# Patient Record
Sex: FEMALE | Race: WHITE | NOT HISPANIC OR LATINO | URBAN - METROPOLITAN AREA
[De-identification: names, ages, dates, MRNs, and addresses within clinical notes are randomized per-mention and may not be internally consistent; named-entity substitution may affect disease eponyms.]

---

## 2017-05-02 ENCOUNTER — OFFICE (OUTPATIENT)
Dept: URBAN - METROPOLITAN AREA CLINIC 10 | Facility: CLINIC | Age: 72
End: 2017-05-02
Payer: COMMERCIAL

## 2017-05-02 VITALS
DIASTOLIC BLOOD PRESSURE: 80 MMHG | WEIGHT: 149 LBS | HEART RATE: 73 BPM | HEIGHT: 64 IN | SYSTOLIC BLOOD PRESSURE: 176 MMHG

## 2017-05-02 DIAGNOSIS — E11.9 TYPE 2 DIABETES MELLITUS WITHOUT COMPLICATIONS: ICD-10-CM

## 2017-05-02 DIAGNOSIS — R11.0 NAUSEA: ICD-10-CM

## 2017-05-02 DIAGNOSIS — I10 ESSENTIAL (PRIMARY) HYPERTENSION: ICD-10-CM

## 2017-05-02 DIAGNOSIS — R19.7 DIARRHEA, UNSPECIFIED: ICD-10-CM

## 2017-05-02 DIAGNOSIS — R10.30 LOWER ABDOMINAL PAIN, UNSPECIFIED: ICD-10-CM

## 2017-05-02 DIAGNOSIS — E66.3 OVERWEIGHT: ICD-10-CM

## 2017-05-02 LAB
CBC COMPLETE BLOOD COUNT W/O DIFF: HEMATOCRIT: 37.3 % (ref 36–48)
CBC COMPLETE BLOOD COUNT W/O DIFF: HEMOGLOBIN: 12.6 G/DL (ref 12–16)
CBC COMPLETE BLOOD COUNT W/O DIFF: MCH: 29.2 PG (ref 25–35)
CBC COMPLETE BLOOD COUNT W/O DIFF: MCHC: 33.8 % (ref 30–38)
CBC COMPLETE BLOOD COUNT W/O DIFF: MCV: 86.3 FL (ref 78–102)
CBC COMPLETE BLOOD COUNT W/O DIFF: PLATELET COUNT: 194 K/UL (ref 150–450)
CBC COMPLETE BLOOD COUNT W/O DIFF: RBC DISTRIBUTION WIDTH: 13.8 % (ref 11.5–16)
CBC COMPLETE BLOOD COUNT W/O DIFF: RED BLOOD CELL COUNT: 4.32 M/UL (ref 4–5.5)
CBC COMPLETE BLOOD COUNT W/O DIFF: WHITE BLOOD CELL COUNT: 5.1 K/UL (ref 4–11)
COMPREHENSIVE METABOLIC PANEL: ALBUMIN: 4.4 G/DL (ref 3.5–5.2)
COMPREHENSIVE METABOLIC PANEL: ALKALINE PHOSPHATASE: 150 U/L — HIGH (ref 38–146)
COMPREHENSIVE METABOLIC PANEL: CALCIUM TOTAL: 10.4 MG/DL (ref 8.5–10.5)
COMPREHENSIVE METABOLIC PANEL: CARBON DIOXIDE: 27 MEQ/L (ref 21–31)
COMPREHENSIVE METABOLIC PANEL: CHLORIDE: 95 MEQ/L — LOW (ref 96–106)
COMPREHENSIVE METABOLIC PANEL: CREATININE: 0.73 MG/DL (ref 0.6–1.3)
COMPREHENSIVE METABOLIC PANEL: FASTING/NON-FASTING: (no result)
COMPREHENSIVE METABOLIC PANEL: GLUCOSE: 212 MG/DL — HIGH (ref 65–100)
COMPREHENSIVE METABOLIC PANEL: POTASSIUM: 4.5 MEQ/ML (ref 3.5–5.4)
COMPREHENSIVE METABOLIC PANEL: SGOT (AST): 32 U/L (ref 13–40)
COMPREHENSIVE METABOLIC PANEL: SGPT (ALT): 32 U/L (ref 7–52)
COMPREHENSIVE METABOLIC PANEL: SODIUM: 139 MEQ/L (ref 135–145)
COMPREHENSIVE METABOLIC PANEL: TOTAL BILIRUBIN: 0.4 MG/DL (ref 0.3–1.2)
COMPREHENSIVE METABOLIC PANEL: TOTAL PROTEIN: 6.9 G/DL (ref 6.4–8.3)
COMPREHENSIVE METABOLIC PANEL: UREA NITROGEN: 20 MG/DL (ref 8–23)
THYROID STIMULATING HORMONE: TSH: 0.53 MIU/L (ref 0.3–4.2)

## 2017-05-02 PROCEDURE — G8427 DOCREV CUR MEDS BY ELIG CLIN: HCPCS

## 2017-05-02 PROCEDURE — 99214 OFFICE O/P EST MOD 30 MIN: CPT

## 2017-05-06 LAB — MOLECULAR STOOL PATHOGEN PANEL: STPANL: (no result)

## 2017-11-29 ENCOUNTER — OFFICE (OUTPATIENT)
Dept: URBAN - METROPOLITAN AREA CLINIC 10 | Facility: CLINIC | Age: 72
End: 2017-11-29
Payer: COMMERCIAL

## 2017-11-29 VITALS
HEIGHT: 64 IN | DIASTOLIC BLOOD PRESSURE: 65 MMHG | HEART RATE: 80 BPM | SYSTOLIC BLOOD PRESSURE: 151 MMHG | WEIGHT: 149 LBS

## 2017-11-29 DIAGNOSIS — R74.8 ABNORMAL LEVELS OF OTHER SERUM ENZYMES: ICD-10-CM

## 2017-11-29 DIAGNOSIS — R93.2 ABNORMAL FINDINGS ON DIAGNOSTIC IMAGING OF LIVER AND BILIARY: ICD-10-CM

## 2017-11-29 LAB
CREATININE, SERUM: 0.67 MG/DL (ref 0.57–1)
CREATININE, SERUM: EGFR IF AFRICN AM: 102 ML/MIN/1.73 (ref 59–?)
CREATININE, SERUM: EGFR IF NONAFRICN AM: 89 ML/MIN/1.73 (ref 59–?)
HEPATIC FUNCTION PANEL (7): ALBUMIN, SERUM: 4.4 G/DL (ref 3.5–4.8)
HEPATIC FUNCTION PANEL (7): ALKALINE PHOSPHATASE, S: 242 IU/L — HIGH (ref 39–117)
HEPATIC FUNCTION PANEL (7): ALT (SGPT): 59 IU/L — HIGH (ref 0–32)
HEPATIC FUNCTION PANEL (7): AST (SGOT): 69 IU/L — HIGH (ref 0–40)
HEPATIC FUNCTION PANEL (7): BILIRUBIN, DIRECT: 0.25 MG/DL (ref 0–0.4)
HEPATIC FUNCTION PANEL (7): BILIRUBIN, TOTAL: 0.5 MG/DL (ref 0–1.2)
HEPATIC FUNCTION PANEL (7): PROTEIN, TOTAL, SERUM: 7.3 G/DL (ref 6–8.5)

## 2017-11-29 PROCEDURE — G8427 DOCREV CUR MEDS BY ELIG CLIN: HCPCS

## 2017-11-29 PROCEDURE — 99214 OFFICE O/P EST MOD 30 MIN: CPT

## 2017-12-04 ENCOUNTER — OFFICE (OUTPATIENT)
Dept: URBAN - METROPOLITAN AREA CLINIC 23 | Facility: CLINIC | Age: 72
End: 2017-12-04
Payer: COMMERCIAL

## 2017-12-04 DIAGNOSIS — R93.2 ABNORMAL FINDINGS ON DIAGNOSTIC IMAGING OF LIVER AND BILIARY: ICD-10-CM

## 2017-12-04 PROCEDURE — 74177 CT ABD & PELVIS W/CONTRAST: CPT | Mod: TC

## 2017-12-13 ENCOUNTER — OFFICE (OUTPATIENT)
Dept: URBAN - METROPOLITAN AREA CLINIC 10 | Facility: CLINIC | Age: 72
End: 2017-12-13

## 2018-01-29 ENCOUNTER — OFFICE (OUTPATIENT)
Dept: URBAN - METROPOLITAN AREA CLINIC 10 | Facility: CLINIC | Age: 73
End: 2018-01-29

## 2018-02-07 ENCOUNTER — OFFICE (OUTPATIENT)
Dept: URBAN - METROPOLITAN AREA CLINIC 10 | Facility: CLINIC | Age: 73
End: 2018-02-07
Payer: COMMERCIAL

## 2018-02-07 VITALS
WEIGHT: 141 LBS | HEART RATE: 87 BPM | HEIGHT: 64 IN | DIASTOLIC BLOOD PRESSURE: 65 MMHG | SYSTOLIC BLOOD PRESSURE: 156 MMHG

## 2018-02-07 DIAGNOSIS — K74.60 UNSPECIFIED CIRRHOSIS OF LIVER: ICD-10-CM

## 2018-02-07 DIAGNOSIS — R94.5 ABNORMAL RESULTS OF LIVER FUNCTION STUDIES: ICD-10-CM

## 2018-02-07 DIAGNOSIS — E11.9 TYPE 2 DIABETES MELLITUS WITHOUT COMPLICATIONS: ICD-10-CM

## 2018-02-07 DIAGNOSIS — I10 ESSENTIAL (PRIMARY) HYPERTENSION: ICD-10-CM

## 2018-02-07 DIAGNOSIS — K75.81 NONALCOHOLIC STEATOHEPATITIS (NASH): ICD-10-CM

## 2018-02-07 LAB
ANTINUCLEAR ANTIBODIES, IFA: POSITIVE
FANA STAINING PATTERNS: CENTRIOLE PATTERN: (no result)
FANA STAINING PATTERNS: CENTROMERE PATTERN: (no result)
FANA STAINING PATTERNS: HOMOGENEOUS PATTERN: ABNORMAL
FANA STAINING PATTERNS: MIDBODY PATTERN: (no result)
FANA STAINING PATTERNS: NOTE: (no result)
FANA STAINING PATTERNS: NUCLEAR DOT PATTERN: (no result)
FANA STAINING PATTERNS: NUCLEAR MEMBRANE PATTERN: (no result)
FANA STAINING PATTERNS: NUCLEOLAR PATTERN: (no result)
FANA STAINING PATTERNS: PCNA PATTERN: (no result)
FANA STAINING PATTERNS: SPECKLED PATTERN: ABNORMAL
FANA STAINING PATTERNS: SPINDLE APPARATUS PATTERN: (no result)
HBSAG SCREEN: NEGATIVE
HCV ANTIBODY: HEP C VIRUS AB: <0.1 S/CO RATIO
HEP B SURFACE AB: HEP B SURFACE AB, QUAL: NON REACTIVE

## 2018-02-07 PROCEDURE — 99214 OFFICE O/P EST MOD 30 MIN: CPT

## 2018-04-03 ENCOUNTER — OFFICE (OUTPATIENT)
Dept: URBAN - METROPOLITAN AREA CLINIC 10 | Facility: CLINIC | Age: 73
End: 2018-04-03
Payer: COMMERCIAL

## 2018-04-03 VITALS
SYSTOLIC BLOOD PRESSURE: 157 MMHG | WEIGHT: 143 LBS | HEART RATE: 66 BPM | DIASTOLIC BLOOD PRESSURE: 80 MMHG | HEIGHT: 64 IN

## 2018-04-03 DIAGNOSIS — E11.9 TYPE 2 DIABETES MELLITUS WITHOUT COMPLICATIONS: ICD-10-CM

## 2018-04-03 DIAGNOSIS — I10 ESSENTIAL (PRIMARY) HYPERTENSION: ICD-10-CM

## 2018-04-03 DIAGNOSIS — R74.8 ABNORMAL LEVELS OF OTHER SERUM ENZYMES: ICD-10-CM

## 2018-04-03 PROCEDURE — 99213 OFFICE O/P EST LOW 20 MIN: CPT

## 2018-07-24 ENCOUNTER — OFFICE (OUTPATIENT)
Dept: URBAN - METROPOLITAN AREA CLINIC 10 | Facility: CLINIC | Age: 73
End: 2018-07-24
Payer: COMMERCIAL

## 2018-07-24 VITALS
SYSTOLIC BLOOD PRESSURE: 135 MMHG | DIASTOLIC BLOOD PRESSURE: 60 MMHG | HEIGHT: 64 IN | HEART RATE: 70 BPM | WEIGHT: 136 LBS

## 2018-07-24 DIAGNOSIS — R13.10 DYSPHAGIA, UNSPECIFIED: ICD-10-CM

## 2018-07-24 DIAGNOSIS — K74.60 UNSPECIFIED CIRRHOSIS OF LIVER: ICD-10-CM

## 2018-07-24 LAB
ACTIN (SMOOTH MUSCLE) ANTIBODY: 10 UNITS (ref 0–19)
ALPHA-1-ANTITRYPSIN, SERUM: 207 MG/DL — HIGH (ref 90–200)
CBC, PLATELET, NO DIFFERENTIAL: HEMATOCRIT: 37.8 % (ref 34–46.6)
CBC, PLATELET, NO DIFFERENTIAL: HEMOGLOBIN: 12.3 G/DL (ref 11.1–15.9)
CBC, PLATELET, NO DIFFERENTIAL: MCH: 29.4 PG (ref 26.6–33)
CBC, PLATELET, NO DIFFERENTIAL: MCHC: 32.5 G/DL (ref 31.5–35.7)
CBC, PLATELET, NO DIFFERENTIAL: MCV: 90 FL (ref 79–97)
CBC, PLATELET, NO DIFFERENTIAL: PLATELETS: 156 X10E3/UL (ref 150–379)
CBC, PLATELET, NO DIFFERENTIAL: RBC: 4.18 X10E6/UL (ref 3.77–5.28)
CBC, PLATELET, NO DIFFERENTIAL: RDW: 14.6 % (ref 12.3–15.4)
CBC, PLATELET, NO DIFFERENTIAL: WBC: 6.2 X10E3/UL (ref 3.4–10.8)
CERULOPLASMIN: 48.5 MG/DL — HIGH (ref 19–39)
COMP. METABOLIC PANEL (14): A/G RATIO: 1.5 (ref 1.2–2.2)
COMP. METABOLIC PANEL (14): ALBUMIN: 4.3 G/DL (ref 3.5–4.8)
COMP. METABOLIC PANEL (14): ALKALINE PHOSPHATASE: 339 IU/L — HIGH (ref 39–117)
COMP. METABOLIC PANEL (14): ALT (SGPT): 57 IU/L — HIGH (ref 0–32)
COMP. METABOLIC PANEL (14): AST (SGOT): 70 IU/L — HIGH (ref 0–40)
COMP. METABOLIC PANEL (14): BILIRUBIN, TOTAL: 0.4 MG/DL (ref 0–1.2)
COMP. METABOLIC PANEL (14): BUN/CREATININE RATIO: 24 (ref 12–28)
COMP. METABOLIC PANEL (14): BUN: 17 MG/DL (ref 8–27)
COMP. METABOLIC PANEL (14): CALCIUM: 10.3 MG/DL (ref 8.7–10.3)
COMP. METABOLIC PANEL (14): CARBON DIOXIDE, TOTAL: 27 MMOL/L (ref 20–29)
COMP. METABOLIC PANEL (14): CHLORIDE: 100 MMOL/L (ref 96–106)
COMP. METABOLIC PANEL (14): CREATININE: 0.7 MG/DL (ref 0.57–1)
COMP. METABOLIC PANEL (14): EGFR IF AFRICN AM: 100 ML/MIN/1.73 (ref 59–?)
COMP. METABOLIC PANEL (14): EGFR IF NONAFRICN AM: 87 ML/MIN/1.73 (ref 59–?)
COMP. METABOLIC PANEL (14): GLOBULIN, TOTAL: 2.9 G/DL (ref 1.5–4.5)
COMP. METABOLIC PANEL (14): GLUCOSE: 86 MG/DL (ref 65–99)
COMP. METABOLIC PANEL (14): POTASSIUM: 3.5 MMOL/L (ref 3.5–5.2)
COMP. METABOLIC PANEL (14): PROTEIN, TOTAL: 7.2 G/DL (ref 6–8.5)
COMP. METABOLIC PANEL (14): SODIUM: 145 MMOL/L — HIGH (ref 134–144)
FE+TIBC+FER: FERRITIN, SERUM: 115 NG/ML (ref 15–150)
FE+TIBC+FER: IRON BIND.CAP.(TIBC): 346 UG/DL (ref 250–450)
FE+TIBC+FER: IRON SATURATION: 17 % (ref 15–55)
FE+TIBC+FER: IRON: 60 UG/DL (ref 27–139)
FE+TIBC+FER: UIBC: 286 UG/DL (ref 118–369)
IMMUNOGLOBULIN G, QN, SERUM: 634 MG/DL — LOW (ref 700–1600)
MITOCHONDRIAL (M2) ANTIBODY: 4 UNITS (ref 0–20)
PROTHROMBIN TIME (PT): INR: 1.1 (ref 0.8–1.2)
PROTHROMBIN TIME (PT): PROTHROMBIN TIME: 11 SEC (ref 9.1–12)

## 2018-07-24 PROCEDURE — 99214 OFFICE O/P EST MOD 30 MIN: CPT | Performed by: INTERNAL MEDICINE

## 2018-07-24 NOTE — SERVICEHPINOTES
Ms. Levine is 73 years old. She is here for initial visit with me (former [facility] patient) with chief complaint of RUQ abdominal pain. She describes pain as constant and all the time. She denies mitigating or inciting factors. She describes pain as dull ache. She describes the pain as being for 3 years. She denies nocturnal symptoms. She also complains of constant nausea. She denies emesis. She describes 13 pound weight loss over the past 7 months. She states she "can't eat". The thought of food makes her have nausea. She describes dysphagia to solids. She also feels liquids will have difficulty but solids are more frequent.   Review of records from Dr. Montague:BR-4/2018-HCT 44.3/MCV 90/PLT 122BR-03/2018-homocystine level elevated at 17.5BR--AST 67/ALT 65/TB 0.7/alp 278/albumin 3.9BR--creatinine 0.71BR--TSH normalBR-07/2017-hep B core IgM negative, hep B surface antigen negativeBR--HCV antibody negativeBR--hep a IgM negativeBR--cortisol ykzvlu11/2018-hep B surface antibody negativeBR--ADRIANA positive at a titer > 1:217237/2017-CT scan of abdomen and pelvis with contrast: Moderate hepatomegaly with marked splenomegaly. Steatosis without mass. Several splenules. Gallbladder absent. Impression reads “cirrhosis and associated sequela”11/2017-liver ultrasound: Liver is 19.6 cm. Increased echogenicity without solid mass. Biliary tree normal. Normal flow in portal vein. Gallbladder absent.05/2017-fecal occult blood test negative x309/2015-EGD by Dr. Olvera for anemia on Eliquis: Hiatal hernia with Schatzki's ring. Antral gastropathy negative for H pylori.-CT scan of abdomen and pelvis with contrast: Hazy density in lilliam hepatis and extending inferiorly around the medial aspect of the inferior right lobe of liver with small amount of associated fluid. Pancreas normal. Status post cholecystectomy without biliary dilatation. Liver normal. Mild splenomegaly. Mildly enlarged gastrohepatic ligament lymph nodes unchanged since 2009.09/2014-EGD for epigastric pain: Small hiatal hernia. Negative for H pyloriBR-colonoscopy for diarrhea and family history of colon polyps: Left-sided diverticulosis. Internal hemorrhoids. Random biopsies demonstrating mild acute colitis consistent with acute self-limited colitis.

## 2018-09-04 ENCOUNTER — AMBULATORY SURGICAL CENTER (OUTPATIENT)
Dept: URBAN - METROPOLITAN AREA SURGERY 1 | Facility: SURGERY | Age: 73
End: 2018-09-04
Payer: COMMERCIAL

## 2018-09-04 ENCOUNTER — OFFICE (OUTPATIENT)
Dept: URBAN - METROPOLITAN AREA PATHOLOGY 22 | Facility: PATHOLOGY | Age: 73
End: 2018-09-04
Payer: COMMERCIAL

## 2018-09-04 VITALS
HEART RATE: 67 BPM | OXYGEN SATURATION: 99 % | HEART RATE: 74 BPM | OXYGEN SATURATION: 99 % | OXYGEN SATURATION: 94 % | DIASTOLIC BLOOD PRESSURE: 77 MMHG | HEART RATE: 67 BPM | SYSTOLIC BLOOD PRESSURE: 142 MMHG | SYSTOLIC BLOOD PRESSURE: 134 MMHG | HEIGHT: 64 IN | OXYGEN SATURATION: 96 % | SYSTOLIC BLOOD PRESSURE: 134 MMHG | SYSTOLIC BLOOD PRESSURE: 132 MMHG | SYSTOLIC BLOOD PRESSURE: 147 MMHG | HEART RATE: 75 BPM | HEART RATE: 80 BPM | SYSTOLIC BLOOD PRESSURE: 147 MMHG | WEIGHT: 136 LBS | RESPIRATION RATE: 20 BRPM | SYSTOLIC BLOOD PRESSURE: 116 MMHG | RESPIRATION RATE: 20 BRPM | DIASTOLIC BLOOD PRESSURE: 59 MMHG | HEIGHT: 64 IN | SYSTOLIC BLOOD PRESSURE: 116 MMHG | HEART RATE: 74 BPM | DIASTOLIC BLOOD PRESSURE: 64 MMHG | SYSTOLIC BLOOD PRESSURE: 142 MMHG | DIASTOLIC BLOOD PRESSURE: 60 MMHG | HEART RATE: 80 BPM | DIASTOLIC BLOOD PRESSURE: 59 MMHG | RESPIRATION RATE: 16 BRPM | OXYGEN SATURATION: 94 % | HEART RATE: 75 BPM | TEMPERATURE: 97.9 F | TEMPERATURE: 97.9 F | RESPIRATION RATE: 16 BRPM | OXYGEN SATURATION: 95 % | WEIGHT: 136 LBS | HEART RATE: 68 BPM | DIASTOLIC BLOOD PRESSURE: 77 MMHG | DIASTOLIC BLOOD PRESSURE: 60 MMHG | DIASTOLIC BLOOD PRESSURE: 74 MMHG | OXYGEN SATURATION: 95 % | DIASTOLIC BLOOD PRESSURE: 74 MMHG | SYSTOLIC BLOOD PRESSURE: 178 MMHG | HEART RATE: 68 BPM | DIASTOLIC BLOOD PRESSURE: 82 MMHG | DIASTOLIC BLOOD PRESSURE: 64 MMHG | SYSTOLIC BLOOD PRESSURE: 178 MMHG | DIASTOLIC BLOOD PRESSURE: 82 MMHG | OXYGEN SATURATION: 96 % | SYSTOLIC BLOOD PRESSURE: 132 MMHG

## 2018-09-04 DIAGNOSIS — K22.2 ESOPHAGEAL OBSTRUCTION: ICD-10-CM

## 2018-09-04 DIAGNOSIS — K29.50 UNSPECIFIED CHRONIC GASTRITIS WITHOUT BLEEDING: ICD-10-CM

## 2018-09-04 DIAGNOSIS — R10.13 EPIGASTRIC PAIN: ICD-10-CM

## 2018-09-04 DIAGNOSIS — K31.7 POLYP OF STOMACH AND DUODENUM: ICD-10-CM

## 2018-09-04 DIAGNOSIS — I85.00 ESOPHAGEAL VARICES WITHOUT BLEEDING: ICD-10-CM

## 2018-09-04 DIAGNOSIS — R13.10 DYSPHAGIA, UNSPECIFIED: ICD-10-CM

## 2018-09-04 PROBLEM — K31.89 OTHER DISEASES OF STOMACH AND DUODENUM: Status: ACTIVE | Noted: 2018-09-04

## 2018-09-04 PROCEDURE — 88313 SPECIAL STAINS GROUP 2: CPT | Performed by: INTERNAL MEDICINE

## 2018-09-04 PROCEDURE — 43249 ESOPH EGD DILATION <30 MM: CPT | Performed by: INTERNAL MEDICINE

## 2018-09-04 PROCEDURE — G8907 PT DOC NO EVENTS ON DISCHARG: HCPCS | Performed by: INTERNAL MEDICINE

## 2018-09-04 PROCEDURE — 43239 EGD BIOPSY SINGLE/MULTIPLE: CPT | Mod: 59 | Performed by: INTERNAL MEDICINE

## 2018-09-04 PROCEDURE — 88341 IMHCHEM/IMCYTCHM EA ADD ANTB: CPT | Performed by: INTERNAL MEDICINE

## 2018-09-04 PROCEDURE — G8918 PT W/O PREOP ORDER IV AB PRO: HCPCS | Performed by: INTERNAL MEDICINE

## 2018-09-04 PROCEDURE — 88342 IMHCHEM/IMCYTCHM 1ST ANTB: CPT | Performed by: INTERNAL MEDICINE

## 2018-09-04 PROCEDURE — 88305 TISSUE EXAM BY PATHOLOGIST: CPT | Performed by: INTERNAL MEDICINE

## 2019-01-08 ENCOUNTER — OFFICE (OUTPATIENT)
Dept: URBAN - METROPOLITAN AREA CLINIC 10 | Facility: CLINIC | Age: 74
End: 2019-01-08
Payer: COMMERCIAL

## 2019-01-08 VITALS
HEIGHT: 64 IN | WEIGHT: 132 LBS | HEART RATE: 85 BPM | SYSTOLIC BLOOD PRESSURE: 118 MMHG | DIASTOLIC BLOOD PRESSURE: 48 MMHG

## 2019-01-08 DIAGNOSIS — R13.10 DYSPHAGIA, UNSPECIFIED: ICD-10-CM

## 2019-01-08 DIAGNOSIS — D64.9 ANEMIA, UNSPECIFIED: ICD-10-CM

## 2019-01-08 LAB
CBC, PLATELET, NO DIFFERENTIAL: HEMATOCRIT: 26.9 % — LOW (ref 34–46.6)
CBC, PLATELET, NO DIFFERENTIAL: HEMOGLOBIN: 8.4 G/DL — LOW (ref 11.1–15.9)
CBC, PLATELET, NO DIFFERENTIAL: MCH: 28.5 PG (ref 26.6–33)
CBC, PLATELET, NO DIFFERENTIAL: MCHC: 31.2 G/DL — LOW (ref 31.5–35.7)
CBC, PLATELET, NO DIFFERENTIAL: MCV: 91 FL (ref 79–97)
CBC, PLATELET, NO DIFFERENTIAL: PLATELETS: 178 X10E3/UL (ref 150–379)
CBC, PLATELET, NO DIFFERENTIAL: RBC: 2.95 X10E6/UL — LOW (ref 3.77–5.28)
CBC, PLATELET, NO DIFFERENTIAL: RDW: 15.2 % (ref 12.3–15.4)
CBC, PLATELET, NO DIFFERENTIAL: WBC: 5.5 X10E3/UL (ref 3.4–10.8)
COMP. METABOLIC PANEL (14): A/G RATIO: 1.7 (ref 1.2–2.2)
COMP. METABOLIC PANEL (14): ALBUMIN: 4.4 G/DL (ref 3.5–4.8)
COMP. METABOLIC PANEL (14): ALKALINE PHOSPHATASE: 217 IU/L — HIGH (ref 39–117)
COMP. METABOLIC PANEL (14): ALT (SGPT): 30 IU/L (ref 0–32)
COMP. METABOLIC PANEL (14): AST (SGOT): 32 IU/L (ref 0–40)
COMP. METABOLIC PANEL (14): BILIRUBIN, TOTAL: 0.3 MG/DL (ref 0–1.2)
COMP. METABOLIC PANEL (14): BUN/CREATININE RATIO: 39 — HIGH (ref 12–28)
COMP. METABOLIC PANEL (14): BUN: 27 MG/DL (ref 8–27)
COMP. METABOLIC PANEL (14): CALCIUM: 10 MG/DL (ref 8.7–10.3)
COMP. METABOLIC PANEL (14): CARBON DIOXIDE, TOTAL: 24 MMOL/L (ref 20–29)
COMP. METABOLIC PANEL (14): CHLORIDE: 99 MMOL/L (ref 96–106)
COMP. METABOLIC PANEL (14): CREATININE: 0.69 MG/DL (ref 0.57–1)
COMP. METABOLIC PANEL (14): EGFR IF AFRICN AM: 100 ML/MIN/1.73 (ref 59–?)
COMP. METABOLIC PANEL (14): EGFR IF NONAFRICN AM: 87 ML/MIN/1.73 (ref 59–?)
COMP. METABOLIC PANEL (14): GLOBULIN, TOTAL: 2.6 G/DL (ref 1.5–4.5)
COMP. METABOLIC PANEL (14): GLUCOSE: 123 MG/DL — HIGH (ref 65–99)
COMP. METABOLIC PANEL (14): POTASSIUM: 4.6 MMOL/L (ref 3.5–5.2)
COMP. METABOLIC PANEL (14): PROTEIN, TOTAL: 7 G/DL (ref 6–8.5)
COMP. METABOLIC PANEL (14): SODIUM: 140 MMOL/L (ref 134–144)
FE+TIBC+FER: FERRITIN, SERUM: 18 NG/ML (ref 15–150)
FE+TIBC+FER: IRON BIND.CAP.(TIBC): 521 UG/DL — HIGH (ref 250–450)
FE+TIBC+FER: IRON SATURATION: 6 % — CRITICAL LOW (ref 15–55)
FE+TIBC+FER: IRON: 30 UG/DL (ref 27–139)
FE+TIBC+FER: UIBC: 491 UG/DL — HIGH (ref 118–369)

## 2019-01-08 PROCEDURE — 99213 OFFICE O/P EST LOW 20 MIN: CPT | Performed by: INTERNAL MEDICINE

## 2019-01-22 ENCOUNTER — OFFICE (OUTPATIENT)
Dept: URBAN - METROPOLITAN AREA CLINIC 10 | Facility: CLINIC | Age: 74
End: 2019-01-22

## 2019-02-06 ENCOUNTER — OFFICE (OUTPATIENT)
Dept: URBAN - METROPOLITAN AREA CLINIC 10 | Facility: CLINIC | Age: 74
End: 2019-02-06
Payer: COMMERCIAL

## 2019-02-06 VITALS
HEART RATE: 68 BPM | DIASTOLIC BLOOD PRESSURE: 60 MMHG | HEIGHT: 64 IN | WEIGHT: 137 LBS | SYSTOLIC BLOOD PRESSURE: 138 MMHG

## 2019-02-06 DIAGNOSIS — K74.60 UNSPECIFIED CIRRHOSIS OF LIVER: ICD-10-CM

## 2019-02-06 DIAGNOSIS — D50.9 IRON DEFICIENCY ANEMIA, UNSPECIFIED: ICD-10-CM

## 2019-02-06 DIAGNOSIS — K57.92 DIVERTICULITIS OF INTESTINE, PART UNSPECIFIED, WITHOUT PERFO: ICD-10-CM

## 2019-02-06 LAB
CBC, PLATELET, NO DIFFERENTIAL: HEMATOCRIT: 33.9 % — LOW (ref 34–46.6)
CBC, PLATELET, NO DIFFERENTIAL: HEMOGLOBIN: 9.8 G/DL — LOW (ref 11.1–15.9)
CBC, PLATELET, NO DIFFERENTIAL: MCH: 26.1 PG — LOW (ref 26.6–33)
CBC, PLATELET, NO DIFFERENTIAL: MCHC: 28.9 G/DL — LOW (ref 31.5–35.7)
CBC, PLATELET, NO DIFFERENTIAL: MCV: 90 FL (ref 79–97)
CBC, PLATELET, NO DIFFERENTIAL: NRBC: (no result)
CBC, PLATELET, NO DIFFERENTIAL: PLATELETS: 127 X10E3/UL — LOW (ref 150–379)
CBC, PLATELET, NO DIFFERENTIAL: RBC: 3.76 X10E6/UL — LOW (ref 3.77–5.28)
CBC, PLATELET, NO DIFFERENTIAL: RDW: 19.7 % — HIGH (ref 12.3–15.4)
CBC, PLATELET, NO DIFFERENTIAL: WBC: 5.7 X10E3/UL (ref 3.4–10.8)
COMP. METABOLIC PANEL (14): A/G RATIO: 2.3 — HIGH (ref 1.2–2.2)
COMP. METABOLIC PANEL (14): ALBUMIN: 4.3 G/DL (ref 3.5–4.8)
COMP. METABOLIC PANEL (14): ALKALINE PHOSPHATASE: 237 IU/L — HIGH (ref 39–117)
COMP. METABOLIC PANEL (14): ALT (SGPT): 22 IU/L (ref 0–32)
COMP. METABOLIC PANEL (14): AST (SGOT): 31 IU/L (ref 0–40)
COMP. METABOLIC PANEL (14): BILIRUBIN, TOTAL: 0.3 MG/DL (ref 0–1.2)
COMP. METABOLIC PANEL (14): BUN/CREATININE RATIO: 20 (ref 12–28)
COMP. METABOLIC PANEL (14): BUN: 13 MG/DL (ref 8–27)
COMP. METABOLIC PANEL (14): CALCIUM: 9.5 MG/DL (ref 8.7–10.3)
COMP. METABOLIC PANEL (14): CARBON DIOXIDE, TOTAL: 26 MMOL/L (ref 20–29)
COMP. METABOLIC PANEL (14): CHLORIDE: 101 MMOL/L (ref 96–106)
COMP. METABOLIC PANEL (14): CREATININE: 0.64 MG/DL (ref 0.57–1)
COMP. METABOLIC PANEL (14): EGFR IF AFRICN AM: 102 ML/MIN/1.73 (ref 59–?)
COMP. METABOLIC PANEL (14): EGFR IF NONAFRICN AM: 89 ML/MIN/1.73 (ref 59–?)
COMP. METABOLIC PANEL (14): GLOBULIN, TOTAL: 1.9 G/DL (ref 1.5–4.5)
COMP. METABOLIC PANEL (14): GLUCOSE: 112 MG/DL — HIGH (ref 65–99)
COMP. METABOLIC PANEL (14): POTASSIUM: 4 MMOL/L (ref 3.5–5.2)
COMP. METABOLIC PANEL (14): PROTEIN, TOTAL: 6.2 G/DL (ref 6–8.5)
COMP. METABOLIC PANEL (14): SODIUM: 143 MMOL/L (ref 134–144)
IMMUNOGLOBULIN G, QN, SERUM: 672 MG/DL — LOW (ref 700–1600)

## 2019-02-06 PROCEDURE — 99213 OFFICE O/P EST LOW 20 MIN: CPT | Performed by: INTERNAL MEDICINE

## 2019-03-08 ENCOUNTER — OFFICE (OUTPATIENT)
Dept: URBAN - METROPOLITAN AREA PATHOLOGY 22 | Facility: PATHOLOGY | Age: 74
End: 2019-03-08
Payer: COMMERCIAL

## 2019-03-08 ENCOUNTER — AMBULATORY SURGICAL CENTER (OUTPATIENT)
Dept: URBAN - METROPOLITAN AREA SURGERY 1 | Facility: SURGERY | Age: 74
End: 2019-03-08
Payer: COMMERCIAL

## 2019-03-08 VITALS
SYSTOLIC BLOOD PRESSURE: 164 MMHG | SYSTOLIC BLOOD PRESSURE: 100 MMHG | TEMPERATURE: 97.8 F | OXYGEN SATURATION: 96 % | WEIGHT: 133 LBS | SYSTOLIC BLOOD PRESSURE: 100 MMHG | DIASTOLIC BLOOD PRESSURE: 56 MMHG | SYSTOLIC BLOOD PRESSURE: 114 MMHG | HEART RATE: 81 BPM | RESPIRATION RATE: 16 BRPM | HEART RATE: 71 BPM | DIASTOLIC BLOOD PRESSURE: 46 MMHG | RESPIRATION RATE: 17 BRPM | DIASTOLIC BLOOD PRESSURE: 56 MMHG | RESPIRATION RATE: 16 BRPM | DIASTOLIC BLOOD PRESSURE: 46 MMHG | DIASTOLIC BLOOD PRESSURE: 52 MMHG | SYSTOLIC BLOOD PRESSURE: 97 MMHG | HEART RATE: 80 BPM | DIASTOLIC BLOOD PRESSURE: 52 MMHG | HEIGHT: 64 IN | OXYGEN SATURATION: 94 % | OXYGEN SATURATION: 95 % | HEART RATE: 72 BPM | TEMPERATURE: 97.7 F | SYSTOLIC BLOOD PRESSURE: 128 MMHG | WEIGHT: 133 LBS | HEART RATE: 72 BPM | OXYGEN SATURATION: 94 % | HEART RATE: 80 BPM | RESPIRATION RATE: 17 BRPM | TEMPERATURE: 97.7 F | HEART RATE: 74 BPM | SYSTOLIC BLOOD PRESSURE: 114 MMHG | HEIGHT: 64 IN | SYSTOLIC BLOOD PRESSURE: 164 MMHG | HEART RATE: 85 BPM | SYSTOLIC BLOOD PRESSURE: 99 MMHG | HEART RATE: 71 BPM | OXYGEN SATURATION: 95 % | HEART RATE: 74 BPM | DIASTOLIC BLOOD PRESSURE: 63 MMHG | SYSTOLIC BLOOD PRESSURE: 97 MMHG | DIASTOLIC BLOOD PRESSURE: 83 MMHG | SYSTOLIC BLOOD PRESSURE: 99 MMHG | DIASTOLIC BLOOD PRESSURE: 83 MMHG | HEART RATE: 85 BPM | SYSTOLIC BLOOD PRESSURE: 128 MMHG | OXYGEN SATURATION: 96 % | HEART RATE: 81 BPM | DIASTOLIC BLOOD PRESSURE: 63 MMHG | TEMPERATURE: 97.8 F

## 2019-03-08 DIAGNOSIS — K64.0 FIRST DEGREE HEMORRHOIDS: ICD-10-CM

## 2019-03-08 DIAGNOSIS — D50.9 IRON DEFICIENCY ANEMIA, UNSPECIFIED: ICD-10-CM

## 2019-03-08 DIAGNOSIS — Z53.8 PROCEDURE AND TREATMENT NOT CARRIED OUT FOR OTHER REASONS: ICD-10-CM

## 2019-03-08 DIAGNOSIS — I85.00 ESOPHAGEAL VARICES WITHOUT BLEEDING: ICD-10-CM

## 2019-03-08 DIAGNOSIS — R13.10 DYSPHAGIA, UNSPECIFIED: ICD-10-CM

## 2019-03-08 PROBLEM — D53.9 UNEXPLAINED IRON DEFICIENCY ANEMIA: Status: ACTIVE | Noted: 2019-03-08

## 2019-03-08 LAB
ANTIPARIETAL CELL ANTIBODY: 1.8 UNITS (ref 0–20)
ANTIPARIETAL CELL ANTIBODY: 1.8 UNITS (ref 0–20)
CBC, PLATELET, NO DIFFERENTIAL: HEMATOCRIT: 33.6 % — LOW (ref 34–46.6)
CBC, PLATELET, NO DIFFERENTIAL: HEMATOCRIT: 33.6 % — LOW (ref 34–46.6)
CBC, PLATELET, NO DIFFERENTIAL: HEMOGLOBIN: 10.6 G/DL — LOW (ref 11.1–15.9)
CBC, PLATELET, NO DIFFERENTIAL: HEMOGLOBIN: 10.6 G/DL — LOW (ref 11.1–15.9)
CBC, PLATELET, NO DIFFERENTIAL: MCH: 28.3 PG (ref 26.6–33)
CBC, PLATELET, NO DIFFERENTIAL: MCH: 28.3 PG (ref 26.6–33)
CBC, PLATELET, NO DIFFERENTIAL: MCHC: 31.5 G/DL (ref 31.5–35.7)
CBC, PLATELET, NO DIFFERENTIAL: MCHC: 31.5 G/DL (ref 31.5–35.7)
CBC, PLATELET, NO DIFFERENTIAL: MCV: 90 FL (ref 79–97)
CBC, PLATELET, NO DIFFERENTIAL: MCV: 90 FL (ref 79–97)
CBC, PLATELET, NO DIFFERENTIAL: NRBC: 4 % — HIGH (ref 0–0)
CBC, PLATELET, NO DIFFERENTIAL: NRBC: 4 % — HIGH (ref 0–0)
CBC, PLATELET, NO DIFFERENTIAL: PLATELETS: 97 X10E3/UL — CRITICAL LOW (ref 150–379)
CBC, PLATELET, NO DIFFERENTIAL: PLATELETS: 97 X10E3/UL — CRITICAL LOW (ref 150–379)
CBC, PLATELET, NO DIFFERENTIAL: RBC: 3.74 X10E6/UL — LOW (ref 3.77–5.28)
CBC, PLATELET, NO DIFFERENTIAL: RBC: 3.74 X10E6/UL — LOW (ref 3.77–5.28)
CBC, PLATELET, NO DIFFERENTIAL: RDW: 19 % — HIGH (ref 12.3–15.4)
CBC, PLATELET, NO DIFFERENTIAL: RDW: 19 % — HIGH (ref 12.3–15.4)
CBC, PLATELET, NO DIFFERENTIAL: WBC: 3.8 X10E3/UL (ref 3.4–10.8)
CBC, PLATELET, NO DIFFERENTIAL: WBC: 3.8 X10E3/UL (ref 3.4–10.8)
INTRINSIC FACTOR ABS, SERUM: 1.1 AU/ML (ref 0–1.1)
INTRINSIC FACTOR ABS, SERUM: 1.1 AU/ML (ref 0–1.1)
PEPSINOGEN I (PG I) S/P: PEPSINOGEN I (PG I) P/S: 39 UG/ML (ref 28–100)
PEPSINOGEN I (PG I) S/P: PEPSINOGEN I (PG I) P/S: 39 UG/ML (ref 28–100)

## 2019-03-08 PROCEDURE — G8918 PT W/O PREOP ORDER IV AB PRO: HCPCS | Performed by: INTERNAL MEDICINE

## 2019-03-08 PROCEDURE — 45378 DIAGNOSTIC COLONOSCOPY: CPT | Mod: 74 | Performed by: INTERNAL MEDICINE

## 2019-03-08 PROCEDURE — 88305 TISSUE EXAM BY PATHOLOGIST: CPT | Performed by: INTERNAL MEDICINE

## 2019-03-08 PROCEDURE — 45378 DIAGNOSTIC COLONOSCOPY: CPT | Mod: 53 | Performed by: INTERNAL MEDICINE

## 2019-03-08 PROCEDURE — 43239 EGD BIOPSY SINGLE/MULTIPLE: CPT | Mod: 51 | Performed by: INTERNAL MEDICINE

## 2019-03-08 PROCEDURE — G8907 PT DOC NO EVENTS ON DISCHARG: HCPCS | Performed by: INTERNAL MEDICINE

## 2019-03-08 NOTE — SERVICEHPINOTES
74-year-old with a history of cricopharyngeal ring and Schatzki's ring worsening dysphagia and iron deficiency anemia that has required blood transfusion.  She denies overt bleeding for abdominal pain today.

## 2019-04-19 ENCOUNTER — INPATIENT HOSPITAL (OUTPATIENT)
Dept: URBAN - METROPOLITAN AREA HOSPITAL 93 | Facility: HOSPITAL | Age: 74
End: 2019-04-19
Payer: COMMERCIAL

## 2019-04-19 DIAGNOSIS — D50.0 IRON DEFICIENCY ANEMIA SECONDARY TO BLOOD LOSS (CHRONIC): ICD-10-CM

## 2019-04-19 DIAGNOSIS — K74.0 HEPATIC FIBROSIS: ICD-10-CM

## 2019-04-19 PROCEDURE — 99223 1ST HOSP IP/OBS HIGH 75: CPT | Performed by: INTERNAL MEDICINE

## 2019-04-20 ENCOUNTER — INPATIENT HOSPITAL (OUTPATIENT)
Dept: URBAN - METROPOLITAN AREA HOSPITAL 93 | Facility: HOSPITAL | Age: 74
End: 2019-04-20
Payer: COMMERCIAL

## 2019-04-20 DIAGNOSIS — D50.0 IRON DEFICIENCY ANEMIA SECONDARY TO BLOOD LOSS (CHRONIC): ICD-10-CM

## 2019-04-20 DIAGNOSIS — K74.0 HEPATIC FIBROSIS: ICD-10-CM

## 2019-04-20 PROCEDURE — 99231 SBSQ HOSP IP/OBS SF/LOW 25: CPT | Performed by: INTERNAL MEDICINE

## 2019-04-21 PROCEDURE — 99231 SBSQ HOSP IP/OBS SF/LOW 25: CPT | Performed by: INTERNAL MEDICINE

## 2019-05-20 ENCOUNTER — INPATIENT HOSPITAL (OUTPATIENT)
Dept: URBAN - METROPOLITAN AREA HOSPITAL 93 | Facility: HOSPITAL | Age: 74
End: 2019-05-20
Payer: COMMERCIAL

## 2019-05-20 DIAGNOSIS — D50.0 IRON DEFICIENCY ANEMIA SECONDARY TO BLOOD LOSS (CHRONIC): ICD-10-CM

## 2019-05-20 DIAGNOSIS — R93.3 ABNORMAL FINDINGS ON DIAGNOSTIC IMAGING OF OTHER PARTS OF DI: ICD-10-CM

## 2019-05-20 DIAGNOSIS — K74.0 HEPATIC FIBROSIS: ICD-10-CM

## 2019-05-20 PROCEDURE — 99223 1ST HOSP IP/OBS HIGH 75: CPT | Performed by: INTERNAL MEDICINE

## 2019-05-21 PROCEDURE — 99231 SBSQ HOSP IP/OBS SF/LOW 25: CPT | Performed by: INTERNAL MEDICINE

## 2019-05-22 PROCEDURE — 99232 SBSQ HOSP IP/OBS MODERATE 35: CPT | Performed by: INTERNAL MEDICINE

## 2019-05-23 ENCOUNTER — INPATIENT HOSPITAL (OUTPATIENT)
Dept: URBAN - METROPOLITAN AREA HOSPITAL 93 | Facility: HOSPITAL | Age: 74
End: 2019-05-23
Payer: COMMERCIAL

## 2019-05-23 DIAGNOSIS — R93.3 ABNORMAL FINDINGS ON DIAGNOSTIC IMAGING OF OTHER PARTS OF DI: ICD-10-CM

## 2019-05-23 DIAGNOSIS — D50.0 IRON DEFICIENCY ANEMIA SECONDARY TO BLOOD LOSS (CHRONIC): ICD-10-CM

## 2019-05-23 DIAGNOSIS — K74.0 HEPATIC FIBROSIS: ICD-10-CM

## 2019-05-23 PROCEDURE — 99232 SBSQ HOSP IP/OBS MODERATE 35: CPT | Performed by: INTERNAL MEDICINE

## 2019-05-24 ENCOUNTER — INPATIENT HOSPITAL (OUTPATIENT)
Dept: URBAN - METROPOLITAN AREA HOSPITAL 93 | Facility: HOSPITAL | Age: 74
End: 2019-05-24
Payer: COMMERCIAL

## 2019-05-24 DIAGNOSIS — R93.3 ABNORMAL FINDINGS ON DIAGNOSTIC IMAGING OF OTHER PARTS OF DI: ICD-10-CM

## 2019-05-24 DIAGNOSIS — K74.0 HEPATIC FIBROSIS: ICD-10-CM

## 2019-05-24 DIAGNOSIS — D50.0 IRON DEFICIENCY ANEMIA SECONDARY TO BLOOD LOSS (CHRONIC): ICD-10-CM

## 2019-05-24 PROCEDURE — 99232 SBSQ HOSP IP/OBS MODERATE 35: CPT | Performed by: INTERNAL MEDICINE

## 2019-06-28 ENCOUNTER — OFFICE (OUTPATIENT)
Dept: URBAN - METROPOLITAN AREA CLINIC 10 | Facility: CLINIC | Age: 74
End: 2019-06-28
Payer: COMMERCIAL

## 2019-06-28 VITALS
HEIGHT: 64 IN | SYSTOLIC BLOOD PRESSURE: 120 MMHG | WEIGHT: 116 LBS | DIASTOLIC BLOOD PRESSURE: 56 MMHG | HEART RATE: 92 BPM

## 2019-06-28 DIAGNOSIS — K57.92 DIVERTICULITIS OF INTESTINE, PART UNSPECIFIED, WITHOUT PERFO: ICD-10-CM

## 2019-06-28 DIAGNOSIS — K92.1 MELENA: ICD-10-CM

## 2019-06-28 DIAGNOSIS — D50.9 IRON DEFICIENCY ANEMIA, UNSPECIFIED: ICD-10-CM

## 2019-06-28 DIAGNOSIS — R11.0 NAUSEA: ICD-10-CM

## 2019-06-28 DIAGNOSIS — R93.3 ABNORMAL FINDINGS ON DIAGNOSTIC IMAGING OF OTHER PARTS OF DI: ICD-10-CM

## 2019-06-28 DIAGNOSIS — K74.60 UNSPECIFIED CIRRHOSIS OF LIVER: ICD-10-CM

## 2019-06-28 LAB
CBC, PLATELET, NO DIFFERENTIAL: HEMATOCRIT: 27.3 % — LOW (ref 34–46.6)
CBC, PLATELET, NO DIFFERENTIAL: HEMOGLOBIN: 9.1 G/DL — LOW (ref 11.1–15.9)
CBC, PLATELET, NO DIFFERENTIAL: MCH: 31.2 PG (ref 26.6–33)
CBC, PLATELET, NO DIFFERENTIAL: MCHC: 33.3 G/DL (ref 31.5–35.7)
CBC, PLATELET, NO DIFFERENTIAL: MCV: 94 FL (ref 79–97)
CBC, PLATELET, NO DIFFERENTIAL: NRBC: (no result)
CBC, PLATELET, NO DIFFERENTIAL: PLATELETS: 180 X10E3/UL (ref 150–450)
CBC, PLATELET, NO DIFFERENTIAL: RBC: 2.92 X10E6/UL — LOW (ref 3.77–5.28)
CBC, PLATELET, NO DIFFERENTIAL: RDW: 16.5 % — HIGH (ref 12.3–15.4)
CBC, PLATELET, NO DIFFERENTIAL: WBC: 7.2 X10E3/UL (ref 3.4–10.8)

## 2019-06-28 PROCEDURE — 99214 OFFICE O/P EST MOD 30 MIN: CPT | Performed by: INTERNAL MEDICINE

## 2019-06-28 RX ORDER — SODIUM PICOSULFATE, MAGNESIUM OXIDE, AND ANHYDROUS CITRIC ACID 10; 3.5; 12 MG/160ML; G/160ML; G/160ML
LIQUID ORAL
Qty: 1 | Refills: 0 | Status: COMPLETED
Start: 2019-06-28 | End: 2019-08-23

## 2019-06-28 RX ORDER — METOCLOPRAMIDE 5 MG/1
TABLET ORAL
Qty: 120 | Refills: 5 | Status: COMPLETED
Start: 2019-06-28 | End: 2020-01-01

## 2019-08-23 ENCOUNTER — AMBULATORY SURGICAL CENTER (OUTPATIENT)
Dept: URBAN - METROPOLITAN AREA SURGERY 1 | Facility: SURGERY | Age: 74
End: 2019-08-23
Payer: COMMERCIAL

## 2019-08-23 ENCOUNTER — OFFICE (OUTPATIENT)
Dept: URBAN - METROPOLITAN AREA PATHOLOGY 22 | Facility: PATHOLOGY | Age: 74
End: 2019-08-23
Payer: COMMERCIAL

## 2019-08-23 VITALS
RESPIRATION RATE: 16 BRPM | RESPIRATION RATE: 14 BRPM | TEMPERATURE: 98.2 F | WEIGHT: 125 LBS | DIASTOLIC BLOOD PRESSURE: 57 MMHG | RESPIRATION RATE: 17 BRPM | HEIGHT: 64 IN | SYSTOLIC BLOOD PRESSURE: 132 MMHG | DIASTOLIC BLOOD PRESSURE: 51 MMHG | WEIGHT: 125 LBS | SYSTOLIC BLOOD PRESSURE: 102 MMHG | SYSTOLIC BLOOD PRESSURE: 132 MMHG | SYSTOLIC BLOOD PRESSURE: 145 MMHG | DIASTOLIC BLOOD PRESSURE: 51 MMHG | TEMPERATURE: 98.2 F | DIASTOLIC BLOOD PRESSURE: 50 MMHG | HEIGHT: 64 IN | SYSTOLIC BLOOD PRESSURE: 105 MMHG | TEMPERATURE: 98.3 F | DIASTOLIC BLOOD PRESSURE: 48 MMHG | SYSTOLIC BLOOD PRESSURE: 105 MMHG | RESPIRATION RATE: 14 BRPM | HEART RATE: 72 BPM | DIASTOLIC BLOOD PRESSURE: 50 MMHG | OXYGEN SATURATION: 100 % | RESPIRATION RATE: 16 BRPM | HEART RATE: 72 BPM | RESPIRATION RATE: 17 BRPM | DIASTOLIC BLOOD PRESSURE: 57 MMHG | HEART RATE: 76 BPM | OXYGEN SATURATION: 100 % | SYSTOLIC BLOOD PRESSURE: 132 MMHG | SYSTOLIC BLOOD PRESSURE: 102 MMHG | DIASTOLIC BLOOD PRESSURE: 50 MMHG | OXYGEN SATURATION: 99 % | RESPIRATION RATE: 14 BRPM | HEART RATE: 72 BPM | DIASTOLIC BLOOD PRESSURE: 54 MMHG | SYSTOLIC BLOOD PRESSURE: 109 MMHG | WEIGHT: 125 LBS | HEART RATE: 68 BPM | HEART RATE: 68 BPM | RESPIRATION RATE: 16 BRPM | OXYGEN SATURATION: 100 % | TEMPERATURE: 98.2 F | SYSTOLIC BLOOD PRESSURE: 102 MMHG | DIASTOLIC BLOOD PRESSURE: 51 MMHG | SYSTOLIC BLOOD PRESSURE: 145 MMHG | HEIGHT: 64 IN | DIASTOLIC BLOOD PRESSURE: 48 MMHG | SYSTOLIC BLOOD PRESSURE: 105 MMHG | RESPIRATION RATE: 17 BRPM | HEART RATE: 76 BPM | OXYGEN SATURATION: 99 % | HEART RATE: 76 BPM | DIASTOLIC BLOOD PRESSURE: 54 MMHG | DIASTOLIC BLOOD PRESSURE: 57 MMHG | OXYGEN SATURATION: 99 % | SYSTOLIC BLOOD PRESSURE: 109 MMHG | SYSTOLIC BLOOD PRESSURE: 145 MMHG | DIASTOLIC BLOOD PRESSURE: 48 MMHG | TEMPERATURE: 98.3 F | SYSTOLIC BLOOD PRESSURE: 109 MMHG | TEMPERATURE: 98.3 F | DIASTOLIC BLOOD PRESSURE: 54 MMHG | HEART RATE: 68 BPM

## 2019-08-23 DIAGNOSIS — R93.3 ABNORMAL FINDINGS ON DIAGNOSTIC IMAGING OF OTHER PARTS OF DI: ICD-10-CM

## 2019-08-23 DIAGNOSIS — D17.79 BENIGN LIPOMATOUS NEOPLASM OF OTHER SITES: ICD-10-CM

## 2019-08-23 DIAGNOSIS — K63.5 POLYP OF COLON: ICD-10-CM

## 2019-08-23 DIAGNOSIS — K55.20 ANGIODYSPLASIA OF COLON WITHOUT HEMORRHAGE: ICD-10-CM

## 2019-08-23 DIAGNOSIS — K57.30 DIVERTICULOSIS OF LARGE INTESTINE WITHOUT PERFORATION OR ABS: ICD-10-CM

## 2019-08-23 PROBLEM — D12.0 BENIGN NEOPLASM OF CECUM: Status: ACTIVE | Noted: 2019-08-23

## 2019-08-23 PROCEDURE — G8918 PT W/O PREOP ORDER IV AB PRO: HCPCS | Performed by: INTERNAL MEDICINE

## 2019-08-23 PROCEDURE — 45388 COLONOSCOPY W/ABLATION: CPT | Performed by: INTERNAL MEDICINE

## 2019-08-23 PROCEDURE — 88305 TISSUE EXAM BY PATHOLOGIST: CPT | Performed by: INTERNAL MEDICINE

## 2019-08-23 PROCEDURE — 45380 COLONOSCOPY AND BIOPSY: CPT | Mod: 59 | Performed by: INTERNAL MEDICINE

## 2019-08-23 PROCEDURE — G8907 PT DOC NO EVENTS ON DISCHARG: HCPCS | Performed by: INTERNAL MEDICINE

## 2020-01-01 ENCOUNTER — OFFICE (OUTPATIENT)
Dept: URBAN - METROPOLITAN AREA CLINIC 10 | Facility: CLINIC | Age: 75
End: 2020-01-01

## 2020-01-01 ENCOUNTER — OFFICE (OUTPATIENT)
Dept: URBAN - METROPOLITAN AREA CLINIC 10 | Facility: CLINIC | Age: 75
End: 2020-01-01
Payer: COMMERCIAL

## 2020-01-01 VITALS
HEART RATE: 110 BPM | DIASTOLIC BLOOD PRESSURE: 67 MMHG | HEIGHT: 64 IN | SYSTOLIC BLOOD PRESSURE: 149 MMHG | WEIGHT: 140 LBS

## 2020-01-01 VITALS
HEART RATE: 62 BPM | HEIGHT: 64 IN | WEIGHT: 145 LBS | SYSTOLIC BLOOD PRESSURE: 143 MMHG | DIASTOLIC BLOOD PRESSURE: 59 MMHG | OXYGEN SATURATION: 92 %

## 2020-01-01 VITALS
SYSTOLIC BLOOD PRESSURE: 119 MMHG | WEIGHT: 111 LBS | OXYGEN SATURATION: 96 % | DIASTOLIC BLOOD PRESSURE: 54 MMHG | HEIGHT: 63 IN | HEART RATE: 93 BPM

## 2020-01-01 DIAGNOSIS — K74.60 UNSPECIFIED CIRRHOSIS OF LIVER: ICD-10-CM

## 2020-01-01 DIAGNOSIS — I50.9 HEART FAILURE, UNSPECIFIED: ICD-10-CM

## 2020-01-01 DIAGNOSIS — E11.9 TYPE 2 DIABETES MELLITUS WITHOUT COMPLICATIONS: ICD-10-CM

## 2020-01-01 DIAGNOSIS — R18.8 OTHER ASCITES: ICD-10-CM

## 2020-01-01 DIAGNOSIS — D64.9 ANEMIA, UNSPECIFIED: ICD-10-CM

## 2020-01-01 DIAGNOSIS — R63.4 ABNORMAL WEIGHT LOSS: ICD-10-CM

## 2020-01-01 DIAGNOSIS — N28.9 DISORDER OF KIDNEY AND URETER, UNSPECIFIED: ICD-10-CM

## 2020-01-01 LAB
CBC, PLATELET, NO DIFFERENTIAL: HEMATOCRIT: 38.5 % (ref 34–46.6)
CBC, PLATELET, NO DIFFERENTIAL: HEMOGLOBIN: 12.8 G/DL (ref 11.1–15.9)
CBC, PLATELET, NO DIFFERENTIAL: MCH: 30 PG (ref 26.6–33)
CBC, PLATELET, NO DIFFERENTIAL: MCHC: 33.2 G/DL (ref 31.5–35.7)
CBC, PLATELET, NO DIFFERENTIAL: MCV: 90 FL (ref 79–97)
CBC, PLATELET, NO DIFFERENTIAL: PLATELETS: 164 X10E3/UL (ref 150–450)
CBC, PLATELET, NO DIFFERENTIAL: RBC: 4.27 X10E6/UL (ref 3.77–5.28)
CBC, PLATELET, NO DIFFERENTIAL: RDW: 13.7 % (ref 11.7–15.4)
CBC, PLATELET, NO DIFFERENTIAL: WBC: 6 X10E3/UL (ref 3.4–10.8)
COMP. METABOLIC PANEL (14): A/G RATIO: 1.4 (ref 1.2–2.2)
COMP. METABOLIC PANEL (14): A/G RATIO: 1.5 (ref 1.2–2.2)
COMP. METABOLIC PANEL (14): ALBUMIN: 3.6 G/DL — LOW (ref 3.7–4.7)
COMP. METABOLIC PANEL (14): ALBUMIN: 3.8 G/DL (ref 3.7–4.7)
COMP. METABOLIC PANEL (14): ALKALINE PHOSPHATASE: 280 IU/L — HIGH (ref 39–117)
COMP. METABOLIC PANEL (14): ALKALINE PHOSPHATASE: 282 IU/L — HIGH (ref 39–117)
COMP. METABOLIC PANEL (14): ALT (SGPT): 20 IU/L (ref 0–32)
COMP. METABOLIC PANEL (14): ALT (SGPT): 31 IU/L (ref 0–32)
COMP. METABOLIC PANEL (14): AST (SGOT): 19 IU/L (ref 0–40)
COMP. METABOLIC PANEL (14): AST (SGOT): 31 IU/L (ref 0–40)
COMP. METABOLIC PANEL (14): BILIRUBIN, TOTAL: 0.7 MG/DL (ref 0–1.2)
COMP. METABOLIC PANEL (14): BILIRUBIN, TOTAL: 1 MG/DL (ref 0–1.2)
COMP. METABOLIC PANEL (14): BUN/CREATININE RATIO: 21 (ref 12–28)
COMP. METABOLIC PANEL (14): BUN/CREATININE RATIO: 26 (ref 12–28)
COMP. METABOLIC PANEL (14): BUN: 17 MG/DL (ref 8–27)
COMP. METABOLIC PANEL (14): BUN: 23 MG/DL (ref 8–27)
COMP. METABOLIC PANEL (14): CALCIUM: 9.6 MG/DL (ref 8.7–10.3)
COMP. METABOLIC PANEL (14): CALCIUM: 9.9 MG/DL (ref 8.7–10.3)
COMP. METABOLIC PANEL (14): CARBON DIOXIDE, TOTAL: 23 MMOL/L (ref 20–29)
COMP. METABOLIC PANEL (14): CARBON DIOXIDE, TOTAL: 28 MMOL/L (ref 20–29)
COMP. METABOLIC PANEL (14): CHLORIDE: 102 MMOL/L (ref 96–106)
COMP. METABOLIC PANEL (14): CHLORIDE: 98 MMOL/L (ref 96–106)
COMP. METABOLIC PANEL (14): CREATININE: 0.82 MG/DL (ref 0.57–1)
COMP. METABOLIC PANEL (14): CREATININE: 0.9 MG/DL (ref 0.57–1)
COMP. METABOLIC PANEL (14): EGFR IF AFRICN AM: 73 ML/MIN/1.73 (ref 59–?)
COMP. METABOLIC PANEL (14): EGFR IF AFRICN AM: 82 ML/MIN/1.73 (ref 59–?)
COMP. METABOLIC PANEL (14): EGFR IF NONAFRICN AM: 63 ML/MIN/1.73 (ref 59–?)
COMP. METABOLIC PANEL (14): EGFR IF NONAFRICN AM: 71 ML/MIN/1.73 (ref 59–?)
COMP. METABOLIC PANEL (14): GLOBULIN, TOTAL: 2.5 G/DL (ref 1.5–4.5)
COMP. METABOLIC PANEL (14): GLOBULIN, TOTAL: 2.6 G/DL (ref 1.5–4.5)
COMP. METABOLIC PANEL (14): GLUCOSE: 197 MG/DL — HIGH (ref 65–99)
COMP. METABOLIC PANEL (14): GLUCOSE: 294 MG/DL — HIGH (ref 65–99)
COMP. METABOLIC PANEL (14): POTASSIUM: 3.2 MMOL/L — LOW (ref 3.5–5.2)
COMP. METABOLIC PANEL (14): POTASSIUM: 3.8 MMOL/L (ref 3.5–5.2)
COMP. METABOLIC PANEL (14): PROTEIN, TOTAL: 6.1 G/DL (ref 6–8.5)
COMP. METABOLIC PANEL (14): PROTEIN, TOTAL: 6.4 G/DL (ref 6–8.5)
COMP. METABOLIC PANEL (14): SODIUM: 139 MMOL/L (ref 134–144)
COMP. METABOLIC PANEL (14): SODIUM: 144 MMOL/L (ref 134–144)
MAGNESIUM: 2.1 MG/DL (ref 1.6–2.3)
MAGNESIUM: 2.1 MG/DL (ref 1.6–2.3)
POTASSIUM, URINE: 30.5 MMOL/L
SODIUM, URINE: 24 MMOL/L
VITAMIN B1 (THIAMINE), BLOOD: VIT. B1, WHOLE BLOOD: 100.2 NMOL/L (ref 66.5–200)
VITAMIN B1 (THIAMINE), BLOOD: VIT. B1, WHOLE BLOOD: 100.2 NMOL/L (ref 66.5–200)
VITAMIN B12 AND FOLATE: FOLATE (FOLIC ACID), SERUM: >20 NG/ML
VITAMIN B12 AND FOLATE: FOLATE (FOLIC ACID), SERUM: >20 NG/ML
VITAMIN B12 AND FOLATE: VITAMIN B12: 1009 PG/ML (ref 232–1245)
VITAMIN B12 AND FOLATE: VITAMIN B12: 1009 PG/ML (ref 232–1245)
VITAMIN D, 25-HYDROXY: 101 NG/ML — HIGH (ref 30–100)
VITAMIN D, 25-HYDROXY: 101 NG/ML — HIGH (ref 30–100)

## 2020-01-01 PROCEDURE — 99213 OFFICE O/P EST LOW 20 MIN: CPT | Performed by: INTERNAL MEDICINE

## 2020-01-01 PROCEDURE — 99214 OFFICE O/P EST MOD 30 MIN: CPT | Performed by: INTERNAL MEDICINE

## 2020-01-01 RX ORDER — HYDROCORTISONE ACETATE 25 MG/1
25 SUPPOSITORY RECTAL
Qty: 10 | Refills: 0 | Status: COMPLETED
Start: 2020-01-01 | End: 2021-01-01

## 2020-01-01 RX ORDER — SUCRALFATE ORAL 1 G/10ML
SUSPENSION ORAL
Qty: 300 | Refills: 1 | Status: COMPLETED
Start: 2020-01-01 | End: 2021-01-01

## 2020-01-01 RX ORDER — SPIRONOLACTONE 50 MG/1
200 TABLET, FILM COATED ORAL
Qty: 90 | Refills: 5 | Status: COMPLETED
Start: 2020-04-21 | End: 2020-01-01

## 2020-01-01 RX ORDER — METOCLOPRAMIDE 5 MG/1
TABLET ORAL
Qty: 120 | Refills: 5 | Status: COMPLETED
Start: 2019-06-28 | End: 2020-01-01

## 2020-01-17 ENCOUNTER — OFFICE (OUTPATIENT)
Dept: URBAN - METROPOLITAN AREA CLINIC 10 | Facility: CLINIC | Age: 75
End: 2020-01-17
Payer: COMMERCIAL

## 2020-01-17 VITALS
SYSTOLIC BLOOD PRESSURE: 146 MMHG | HEART RATE: 70 BPM | WEIGHT: 127 LBS | DIASTOLIC BLOOD PRESSURE: 65 MMHG | HEIGHT: 64 IN

## 2020-01-17 DIAGNOSIS — R11.0 NAUSEA: ICD-10-CM

## 2020-01-17 DIAGNOSIS — K74.60 UNSPECIFIED CIRRHOSIS OF LIVER: ICD-10-CM

## 2020-01-17 DIAGNOSIS — D50.9 IRON DEFICIENCY ANEMIA, UNSPECIFIED: ICD-10-CM

## 2020-01-17 PROCEDURE — 99212 OFFICE O/P EST SF 10 MIN: CPT | Performed by: INTERNAL MEDICINE

## 2020-01-17 RX ORDER — METOCLOPRAMIDE 5 MG/1
TABLET ORAL
Qty: 120 | Refills: 5 | Status: COMPLETED
Start: 2019-06-28 | End: 2020-01-01

## 2020-04-21 ENCOUNTER — OFFICE (OUTPATIENT)
Dept: URBAN - METROPOLITAN AREA CLINIC 10 | Facility: CLINIC | Age: 75
End: 2020-04-21
Payer: COMMERCIAL

## 2020-04-21 VITALS
HEIGHT: 64 IN | DIASTOLIC BLOOD PRESSURE: 72 MMHG | HEART RATE: 118 BPM | TEMPERATURE: 98.4 F | SYSTOLIC BLOOD PRESSURE: 145 MMHG | WEIGHT: 142 LBS

## 2020-04-21 DIAGNOSIS — N39.0 URINARY TRACT INFECTION, SITE NOT SPECIFIED: ICD-10-CM

## 2020-04-21 DIAGNOSIS — K74.60 UNSPECIFIED CIRRHOSIS OF LIVER: ICD-10-CM

## 2020-04-21 DIAGNOSIS — K92.1 MELENA: ICD-10-CM

## 2020-04-21 DIAGNOSIS — R14.0 ABDOMINAL DISTENSION (GASEOUS): ICD-10-CM

## 2020-04-21 DIAGNOSIS — R18.8 OTHER ASCITES: ICD-10-CM

## 2020-04-21 LAB
CBC, PLATELET, NO DIFFERENTIAL: HEMATOCRIT: 39.4 % (ref 34–46.6)
CBC, PLATELET, NO DIFFERENTIAL: HEMOGLOBIN: 12.9 G/DL (ref 11.1–15.9)
CBC, PLATELET, NO DIFFERENTIAL: MCH: 29.6 PG (ref 26.6–33)
CBC, PLATELET, NO DIFFERENTIAL: MCHC: 32.7 G/DL (ref 31.5–35.7)
CBC, PLATELET, NO DIFFERENTIAL: MCV: 90 FL (ref 79–97)
CBC, PLATELET, NO DIFFERENTIAL: PLATELETS: 134 X10E3/UL — LOW (ref 150–450)
CBC, PLATELET, NO DIFFERENTIAL: RBC: 4.36 X10E6/UL (ref 3.77–5.28)
CBC, PLATELET, NO DIFFERENTIAL: RDW: 20.1 % — HIGH (ref 11.7–15.4)
CBC, PLATELET, NO DIFFERENTIAL: WBC: 5.5 X10E3/UL (ref 3.4–10.8)
COMP. METABOLIC PANEL (14): A/G RATIO: 1.8 (ref 1.2–2.2)
COMP. METABOLIC PANEL (14): ALBUMIN: 3.7 G/DL (ref 3.7–4.7)
COMP. METABOLIC PANEL (14): ALKALINE PHOSPHATASE: 268 IU/L — HIGH (ref 39–117)
COMP. METABOLIC PANEL (14): ALT (SGPT): 18 IU/L (ref 0–32)
COMP. METABOLIC PANEL (14): AST (SGOT): 34 IU/L (ref 0–40)
COMP. METABOLIC PANEL (14): BILIRUBIN, TOTAL: 0.8 MG/DL (ref 0–1.2)
COMP. METABOLIC PANEL (14): BUN/CREATININE RATIO: 18 (ref 12–28)
COMP. METABOLIC PANEL (14): BUN: 13 MG/DL (ref 8–27)
COMP. METABOLIC PANEL (14): CALCIUM: 9.8 MG/DL (ref 8.7–10.3)
COMP. METABOLIC PANEL (14): CARBON DIOXIDE, TOTAL: 28 MMOL/L (ref 20–29)
COMP. METABOLIC PANEL (14): CHLORIDE: 94 MMOL/L — LOW (ref 96–106)
COMP. METABOLIC PANEL (14): CREATININE: 0.73 MG/DL (ref 0.57–1)
COMP. METABOLIC PANEL (14): EGFR IF AFRICN AM: 94 ML/MIN/1.73 (ref 59–?)
COMP. METABOLIC PANEL (14): EGFR IF NONAFRICN AM: 81 ML/MIN/1.73 (ref 59–?)
COMP. METABOLIC PANEL (14): GLOBULIN, TOTAL: 2.1 G/DL (ref 1.5–4.5)
COMP. METABOLIC PANEL (14): GLUCOSE: 329 MG/DL — HIGH (ref 65–99)
COMP. METABOLIC PANEL (14): POTASSIUM: 3 MMOL/L — LOW (ref 3.5–5.2)
COMP. METABOLIC PANEL (14): PROTEIN, TOTAL: 5.8 G/DL — LOW (ref 6–8.5)
COMP. METABOLIC PANEL (14): SODIUM: 140 MMOL/L (ref 134–144)
PROTHROMBIN TIME (PT): INR: 1 (ref 0.8–1.2)
PROTHROMBIN TIME (PT): PROTHROMBIN TIME: 10.9 SEC (ref 9.1–12)

## 2020-04-21 PROCEDURE — 99213 OFFICE O/P EST LOW 20 MIN: CPT | Performed by: INTERNAL MEDICINE

## 2020-04-21 RX ORDER — SPIRONOLACTONE 50 MG/1
200 TABLET, FILM COATED ORAL
Qty: 90 | Refills: 5 | Status: COMPLETED
Start: 2020-04-21 | End: 2020-01-01

## 2020-04-21 RX ORDER — SULFAMETHOXAZOLE AND TRIMETHOPRIM 400; 80 MG/1; MG/1
800 TABLET ORAL
Qty: 14 | Refills: 0 | Status: COMPLETED
Start: 2020-04-21 | End: 2020-01-01

## 2021-01-01 ENCOUNTER — OFFICE (OUTPATIENT)
Dept: URBAN - METROPOLITAN AREA CLINIC 10 | Facility: CLINIC | Age: 76
End: 2021-01-01
Payer: COMMERCIAL

## 2021-01-01 ENCOUNTER — INPATIENT HOSPITAL (OUTPATIENT)
Dept: URBAN - METROPOLITAN AREA HOSPITAL 93 | Facility: HOSPITAL | Age: 76
End: 2021-01-01
Payer: COMMERCIAL

## 2021-01-01 ENCOUNTER — OFFICE (OUTPATIENT)
Dept: URBAN - METROPOLITAN AREA CLINIC 10 | Facility: CLINIC | Age: 76
End: 2021-01-01

## 2021-01-01 VITALS
OXYGEN SATURATION: 99 % | WEIGHT: 115 LBS | HEIGHT: 63 IN | DIASTOLIC BLOOD PRESSURE: 50 MMHG | SYSTOLIC BLOOD PRESSURE: 114 MMHG | HEART RATE: 58 BPM

## 2021-01-01 VITALS
HEART RATE: 97 BPM | HEIGHT: 63 IN | WEIGHT: 128 LBS | DIASTOLIC BLOOD PRESSURE: 66 MMHG | SYSTOLIC BLOOD PRESSURE: 123 MMHG

## 2021-01-01 DIAGNOSIS — R18.8 OTHER ASCITES: ICD-10-CM

## 2021-01-01 DIAGNOSIS — K74.60 UNSPECIFIED CIRRHOSIS OF LIVER: ICD-10-CM

## 2021-01-01 DIAGNOSIS — K65.9 PERITONITIS, UNSPECIFIED: ICD-10-CM

## 2021-01-01 DIAGNOSIS — K92.1 MELENA: ICD-10-CM

## 2021-01-01 DIAGNOSIS — K75.81 NONALCOHOLIC STEATOHEPATITIS (NASH): ICD-10-CM

## 2021-01-01 DIAGNOSIS — I50.9 HEART FAILURE, UNSPECIFIED: ICD-10-CM

## 2021-01-01 DIAGNOSIS — R11.2 NAUSEA WITH VOMITING, UNSPECIFIED: ICD-10-CM

## 2021-01-01 DIAGNOSIS — I95.9 HYPOTENSION, UNSPECIFIED: ICD-10-CM

## 2021-01-01 PROCEDURE — 99223 1ST HOSP IP/OBS HIGH 75: CPT | Performed by: INTERNAL MEDICINE

## 2021-01-01 PROCEDURE — 99231 SBSQ HOSP IP/OBS SF/LOW 25: CPT | Performed by: INTERNAL MEDICINE

## 2021-01-01 PROCEDURE — 99213 OFFICE O/P EST LOW 20 MIN: CPT | Performed by: INTERNAL MEDICINE

## 2021-01-01 PROCEDURE — 99221 1ST HOSP IP/OBS SF/LOW 40: CPT | Performed by: INTERNAL MEDICINE

## 2021-01-01 RX ORDER — METOCLOPRAMIDE 5 MG/1
20 TABLET ORAL
Qty: 90 | Refills: 1 | Status: ACTIVE
Start: 2021-01-01